# Patient Record
Sex: MALE | Race: WHITE | Employment: FULL TIME | URBAN - METROPOLITAN AREA
[De-identification: names, ages, dates, MRNs, and addresses within clinical notes are randomized per-mention and may not be internally consistent; named-entity substitution may affect disease eponyms.]

---

## 2020-06-01 ENCOUNTER — HOSPITAL ENCOUNTER (OUTPATIENT)
Dept: PREADMISSION TESTING | Age: 50
Discharge: HOME | End: 2020-06-01
Payer: COMMERCIAL

## 2020-06-01 PROCEDURE — 87635 SARS-COV-2 COVID-19 AMP PRB: CPT

## 2020-06-02 LAB — SARS-COV-2, COV2NT: NOT DETECTED

## 2020-06-03 ENCOUNTER — ANESTHESIA EVENT (OUTPATIENT)
Dept: SURGERY | Age: 50
End: 2020-06-03
Payer: COMMERCIAL

## 2020-06-04 ENCOUNTER — ANESTHESIA (OUTPATIENT)
Dept: SURGERY | Age: 50
End: 2020-06-04
Payer: COMMERCIAL

## 2020-06-04 ENCOUNTER — HOSPITAL ENCOUNTER (OUTPATIENT)
Age: 50
Setting detail: OBSERVATION
Discharge: HOME | End: 2020-06-07
Attending: SURGERY | Admitting: SURGERY
Payer: COMMERCIAL

## 2020-06-04 DIAGNOSIS — Z87.19 S/P REPAIR OF VENTRAL HERNIA: Primary | ICD-10-CM

## 2020-06-04 DIAGNOSIS — Z98.890 S/P REPAIR OF VENTRAL HERNIA: Primary | ICD-10-CM

## 2020-06-04 PROBLEM — K43.0 INCISIONAL HERNIA WITH OBSTRUCTION BUT NO GANGRENE: Status: ACTIVE | Noted: 2020-06-04

## 2020-06-04 PROCEDURE — 77030002933 HC SUT MCRYL J&J -A: Performed by: SURGERY

## 2020-06-04 PROCEDURE — 76060000035 HC ANESTHESIA 2 TO 2.5 HR: Performed by: SURGERY

## 2020-06-04 PROCEDURE — 77030010507 HC ADH SKN DERMBND J&J -B: Performed by: SURGERY

## 2020-06-04 PROCEDURE — 77030008462 HC STPLR SKN PROX J&J -A: Performed by: SURGERY

## 2020-06-04 PROCEDURE — 74011250636 HC RX REV CODE- 250/636: Performed by: SPECIALIST

## 2020-06-04 PROCEDURE — 74011250637 HC RX REV CODE- 250/637: Performed by: SURGERY

## 2020-06-04 PROCEDURE — 74011250636 HC RX REV CODE- 250/636: Performed by: SURGERY

## 2020-06-04 PROCEDURE — 99218 HC RM OBSERVATION: CPT

## 2020-06-04 PROCEDURE — 77030020782 HC GWN BAIR PAWS FLX 3M -B: Performed by: SURGERY

## 2020-06-04 PROCEDURE — 76210000006 HC OR PH I REC 0.5 TO 1 HR: Performed by: SURGERY

## 2020-06-04 PROCEDURE — 77030002916 HC SUT ETHLN J&J -A: Performed by: SURGERY

## 2020-06-04 PROCEDURE — 74011000250 HC RX REV CODE- 250: Performed by: NURSE ANESTHETIST, CERTIFIED REGISTERED

## 2020-06-04 PROCEDURE — 77030002966 HC SUT PDS J&J -A: Performed by: SURGERY

## 2020-06-04 PROCEDURE — 77030040361 HC SLV COMPR DVT MDII -B: Performed by: SURGERY

## 2020-06-04 PROCEDURE — C9290 INJ, BUPIVACAINE LIPOSOME: HCPCS | Performed by: SURGERY

## 2020-06-04 PROCEDURE — C1781 MESH (IMPLANTABLE): HCPCS | Performed by: SURGERY

## 2020-06-04 PROCEDURE — 77030036554: Performed by: SURGERY

## 2020-06-04 PROCEDURE — 74011000258 HC RX REV CODE- 258: Performed by: SURGERY

## 2020-06-04 PROCEDURE — 77030031139 HC SUT VCRL2 J&J -A: Performed by: SURGERY

## 2020-06-04 PROCEDURE — 96376 TX/PRO/DX INJ SAME DRUG ADON: CPT

## 2020-06-04 PROCEDURE — 74011250636 HC RX REV CODE- 250/636: Performed by: NURSE ANESTHETIST, CERTIFIED REGISTERED

## 2020-06-04 PROCEDURE — 77030013567 HC DRN WND RESERV BARD -A: Performed by: SURGERY

## 2020-06-04 PROCEDURE — 77030040504 HC DRN WND MDII -B: Performed by: SURGERY

## 2020-06-04 PROCEDURE — 76010000131 HC OR TIME 2 TO 2.5 HR: Performed by: SURGERY

## 2020-06-04 DEVICE — MESH SURG W6XL6IN POLY POLYLACTIC ACID MFIL KNIT RECTANG: Type: IMPLANTABLE DEVICE | Site: ABDOMEN | Status: FUNCTIONAL

## 2020-06-04 RX ORDER — KETAMINE HYDROCHLORIDE 10 MG/ML
INJECTION, SOLUTION INTRAMUSCULAR; INTRAVENOUS AS NEEDED
Status: DISCONTINUED | OUTPATIENT
Start: 2020-06-04 | End: 2020-06-04 | Stop reason: HOSPADM

## 2020-06-04 RX ORDER — HYDROMORPHONE HYDROCHLORIDE 1 MG/ML
1 INJECTION, SOLUTION INTRAMUSCULAR; INTRAVENOUS; SUBCUTANEOUS
Status: DISCONTINUED | OUTPATIENT
Start: 2020-06-04 | End: 2020-06-07

## 2020-06-04 RX ORDER — FENTANYL CITRATE 50 UG/ML
25 INJECTION, SOLUTION INTRAMUSCULAR; INTRAVENOUS
Status: DISCONTINUED | OUTPATIENT
Start: 2020-06-04 | End: 2020-06-04 | Stop reason: HOSPADM

## 2020-06-04 RX ORDER — DIPHENHYDRAMINE HCL 25 MG
25 CAPSULE ORAL
Status: DISCONTINUED | OUTPATIENT
Start: 2020-06-04 | End: 2020-06-07 | Stop reason: HOSPADM

## 2020-06-04 RX ORDER — ACETAMINOPHEN 325 MG/1
650 TABLET ORAL
Status: DISCONTINUED | OUTPATIENT
Start: 2020-06-04 | End: 2020-06-07 | Stop reason: HOSPADM

## 2020-06-04 RX ORDER — ONDANSETRON 2 MG/ML
4 INJECTION INTRAMUSCULAR; INTRAVENOUS
Status: DISCONTINUED | OUTPATIENT
Start: 2020-06-04 | End: 2020-06-07 | Stop reason: HOSPADM

## 2020-06-04 RX ORDER — DOCUSATE SODIUM 100 MG/1
100 CAPSULE, LIQUID FILLED ORAL 2 TIMES DAILY
Status: DISCONTINUED | OUTPATIENT
Start: 2020-06-04 | End: 2020-06-07 | Stop reason: HOSPADM

## 2020-06-04 RX ORDER — FENTANYL CITRATE 50 UG/ML
INJECTION, SOLUTION INTRAMUSCULAR; INTRAVENOUS AS NEEDED
Status: DISCONTINUED | OUTPATIENT
Start: 2020-06-04 | End: 2020-06-04 | Stop reason: HOSPADM

## 2020-06-04 RX ORDER — HYDROMORPHONE HYDROCHLORIDE 2 MG/ML
INJECTION, SOLUTION INTRAMUSCULAR; INTRAVENOUS; SUBCUTANEOUS AS NEEDED
Status: DISCONTINUED | OUTPATIENT
Start: 2020-06-04 | End: 2020-06-04 | Stop reason: HOSPADM

## 2020-06-04 RX ORDER — PROPOFOL 10 MG/ML
INJECTION, EMULSION INTRAVENOUS AS NEEDED
Status: DISCONTINUED | OUTPATIENT
Start: 2020-06-04 | End: 2020-06-04 | Stop reason: HOSPADM

## 2020-06-04 RX ORDER — MIDAZOLAM HYDROCHLORIDE 1 MG/ML
INJECTION, SOLUTION INTRAMUSCULAR; INTRAVENOUS AS NEEDED
Status: DISCONTINUED | OUTPATIENT
Start: 2020-06-04 | End: 2020-06-04 | Stop reason: HOSPADM

## 2020-06-04 RX ORDER — LIDOCAINE HYDROCHLORIDE 20 MG/ML
INJECTION, SOLUTION EPIDURAL; INFILTRATION; INTRACAUDAL; PERINEURAL AS NEEDED
Status: DISCONTINUED | OUTPATIENT
Start: 2020-06-04 | End: 2020-06-04 | Stop reason: HOSPADM

## 2020-06-04 RX ORDER — ROCURONIUM BROMIDE 10 MG/ML
INJECTION, SOLUTION INTRAVENOUS AS NEEDED
Status: DISCONTINUED | OUTPATIENT
Start: 2020-06-04 | End: 2020-06-04 | Stop reason: HOSPADM

## 2020-06-04 RX ORDER — SODIUM CHLORIDE, SODIUM LACTATE, POTASSIUM CHLORIDE, CALCIUM CHLORIDE 600; 310; 30; 20 MG/100ML; MG/100ML; MG/100ML; MG/100ML
125 INJECTION, SOLUTION INTRAVENOUS CONTINUOUS
Status: DISCONTINUED | OUTPATIENT
Start: 2020-06-04 | End: 2020-06-04

## 2020-06-04 RX ORDER — ONDANSETRON 2 MG/ML
4 INJECTION INTRAMUSCULAR; INTRAVENOUS ONCE
Status: COMPLETED | OUTPATIENT
Start: 2020-06-04 | End: 2020-06-04

## 2020-06-04 RX ORDER — HYDROMORPHONE HYDROCHLORIDE 2 MG/ML
0.5 INJECTION, SOLUTION INTRAMUSCULAR; INTRAVENOUS; SUBCUTANEOUS
Status: COMPLETED | OUTPATIENT
Start: 2020-06-04 | End: 2020-06-04

## 2020-06-04 RX ORDER — SODIUM CHLORIDE, SODIUM LACTATE, POTASSIUM CHLORIDE, CALCIUM CHLORIDE 600; 310; 30; 20 MG/100ML; MG/100ML; MG/100ML; MG/100ML
50 INJECTION, SOLUTION INTRAVENOUS CONTINUOUS
Status: DISCONTINUED | OUTPATIENT
Start: 2020-06-04 | End: 2020-06-04 | Stop reason: HOSPADM

## 2020-06-04 RX ORDER — NALOXONE HYDROCHLORIDE 0.4 MG/ML
0.1 INJECTION, SOLUTION INTRAMUSCULAR; INTRAVENOUS; SUBCUTANEOUS
Status: DISCONTINUED | OUTPATIENT
Start: 2020-06-04 | End: 2020-06-04 | Stop reason: HOSPADM

## 2020-06-04 RX ORDER — OXYCODONE AND ACETAMINOPHEN 5; 325 MG/1; MG/1
1 TABLET ORAL
Status: DISCONTINUED | OUTPATIENT
Start: 2020-06-04 | End: 2020-06-07 | Stop reason: HOSPADM

## 2020-06-04 RX ORDER — SODIUM CHLORIDE 0.9 % (FLUSH) 0.9 %
5-40 SYRINGE (ML) INJECTION AS NEEDED
Status: DISCONTINUED | OUTPATIENT
Start: 2020-06-04 | End: 2020-06-07 | Stop reason: HOSPADM

## 2020-06-04 RX ORDER — SODIUM CHLORIDE 0.9 % (FLUSH) 0.9 %
5-40 SYRINGE (ML) INJECTION EVERY 8 HOURS
Status: DISCONTINUED | OUTPATIENT
Start: 2020-06-04 | End: 2020-06-07 | Stop reason: HOSPADM

## 2020-06-04 RX ORDER — ONDANSETRON 2 MG/ML
INJECTION INTRAMUSCULAR; INTRAVENOUS AS NEEDED
Status: DISCONTINUED | OUTPATIENT
Start: 2020-06-04 | End: 2020-06-04 | Stop reason: HOSPADM

## 2020-06-04 RX ORDER — DEXTROSE, SODIUM CHLORIDE, AND POTASSIUM CHLORIDE 5; .45; .15 G/100ML; G/100ML; G/100ML
50 INJECTION INTRAVENOUS CONTINUOUS
Status: DISCONTINUED | OUTPATIENT
Start: 2020-06-04 | End: 2020-06-07 | Stop reason: HOSPADM

## 2020-06-04 RX ADMIN — SODIUM CHLORIDE, SODIUM LACTATE, POTASSIUM CHLORIDE, AND CALCIUM CHLORIDE: 600; 310; 30; 20 INJECTION, SOLUTION INTRAVENOUS at 15:38

## 2020-06-04 RX ADMIN — ONDANSETRON HYDROCHLORIDE 4 MG: 2 INJECTION INTRAMUSCULAR; INTRAVENOUS at 16:06

## 2020-06-04 RX ADMIN — HYDROMORPHONE HYDROCHLORIDE 0.5 MG: 2 INJECTION, SOLUTION INTRAMUSCULAR; INTRAVENOUS; SUBCUTANEOUS at 18:26

## 2020-06-04 RX ADMIN — FENTANYL CITRATE 25 MCG: 50 INJECTION INTRAMUSCULAR; INTRAVENOUS at 17:50

## 2020-06-04 RX ADMIN — ONDANSETRON 4 MG: 2 INJECTION INTRAMUSCULAR; INTRAVENOUS at 18:16

## 2020-06-04 RX ADMIN — FENTANYL CITRATE 50 MCG: 50 INJECTION, SOLUTION INTRAMUSCULAR; INTRAVENOUS at 15:46

## 2020-06-04 RX ADMIN — OXYCODONE HYDROCHLORIDE AND ACETAMINOPHEN 1 TABLET: 5; 325 TABLET ORAL at 22:20

## 2020-06-04 RX ADMIN — KETAMINE HYDROCHLORIDE 20 MG: 10 INJECTION, SOLUTION INTRAMUSCULAR; INTRAVENOUS at 15:50

## 2020-06-04 RX ADMIN — KETAMINE HYDROCHLORIDE 10 MG: 10 INJECTION, SOLUTION INTRAMUSCULAR; INTRAVENOUS at 15:45

## 2020-06-04 RX ADMIN — FENTANYL CITRATE 50 MCG: 50 INJECTION, SOLUTION INTRAMUSCULAR; INTRAVENOUS at 15:38

## 2020-06-04 RX ADMIN — FENTANYL CITRATE 25 MCG: 50 INJECTION INTRAMUSCULAR; INTRAVENOUS at 18:00

## 2020-06-04 RX ADMIN — SODIUM CHLORIDE, SODIUM LACTATE, POTASSIUM CHLORIDE, AND CALCIUM CHLORIDE 125 ML/HR: 600; 310; 30; 20 INJECTION, SOLUTION INTRAVENOUS at 18:34

## 2020-06-04 RX ADMIN — ROCURONIUM BROMIDE 10 MG: 10 INJECTION, SOLUTION INTRAVENOUS at 17:05

## 2020-06-04 RX ADMIN — ROCURONIUM BROMIDE 50 MG: 10 INJECTION, SOLUTION INTRAVENOUS at 15:46

## 2020-06-04 RX ADMIN — LIDOCAINE HYDROCHLORIDE 80 MG: 20 INJECTION, SOLUTION EPIDURAL; INFILTRATION; INTRACAUDAL; PERINEURAL at 15:46

## 2020-06-04 RX ADMIN — ROCURONIUM BROMIDE 10 MG: 10 INJECTION, SOLUTION INTRAVENOUS at 16:45

## 2020-06-04 RX ADMIN — KETAMINE HYDROCHLORIDE 10 MG: 10 INJECTION, SOLUTION INTRAMUSCULAR; INTRAVENOUS at 16:03

## 2020-06-04 RX ADMIN — HYDROMORPHONE HYDROCHLORIDE 1 MG: 1 INJECTION, SOLUTION INTRAMUSCULAR; INTRAVENOUS; SUBCUTANEOUS at 23:29

## 2020-06-04 RX ADMIN — HYDROMORPHONE HYDROCHLORIDE 1 MG: 1 INJECTION, SOLUTION INTRAMUSCULAR; INTRAVENOUS; SUBCUTANEOUS at 19:43

## 2020-06-04 RX ADMIN — HYDROMORPHONE HYDROCHLORIDE 0.5 MG: 2 INJECTION, SOLUTION INTRAMUSCULAR; INTRAVENOUS; SUBCUTANEOUS at 18:06

## 2020-06-04 RX ADMIN — PROPOFOL 200 MG: 10 INJECTION, EMULSION INTRAVENOUS at 15:46

## 2020-06-04 RX ADMIN — HYDROMORPHONE HYDROCHLORIDE 0.5 MG: 2 INJECTION, SOLUTION INTRAMUSCULAR; INTRAVENOUS; SUBCUTANEOUS at 16:30

## 2020-06-04 RX ADMIN — SUGAMMADEX 200 MG: 100 INJECTION, SOLUTION INTRAVENOUS at 17:34

## 2020-06-04 RX ADMIN — ROCURONIUM BROMIDE 10 MG: 10 INJECTION, SOLUTION INTRAVENOUS at 16:07

## 2020-06-04 RX ADMIN — SODIUM CHLORIDE, SODIUM LACTATE, POTASSIUM CHLORIDE, AND CALCIUM CHLORIDE: 600; 310; 30; 20 INJECTION, SOLUTION INTRAVENOUS at 17:19

## 2020-06-04 RX ADMIN — DEXTROSE MONOHYDRATE, SODIUM CHLORIDE, AND POTASSIUM CHLORIDE 125 ML/HR: 50; 4.5; 1.49 INJECTION, SOLUTION INTRAVENOUS at 23:14

## 2020-06-04 RX ADMIN — SODIUM CHLORIDE, SODIUM LACTATE, POTASSIUM CHLORIDE, AND CALCIUM CHLORIDE 125 ML/HR: 600; 310; 30; 20 INJECTION, SOLUTION INTRAVENOUS at 13:32

## 2020-06-04 RX ADMIN — ONDANSETRON 4 MG: 2 INJECTION INTRAMUSCULAR; INTRAVENOUS at 23:42

## 2020-06-04 RX ADMIN — HYDROMORPHONE HYDROCHLORIDE 0.5 MG: 2 INJECTION, SOLUTION INTRAMUSCULAR; INTRAVENOUS; SUBCUTANEOUS at 18:36

## 2020-06-04 RX ADMIN — MIDAZOLAM 2 MG: 1 INJECTION INTRAMUSCULAR; INTRAVENOUS at 15:38

## 2020-06-04 RX ADMIN — VANCOMYCIN HYDROCHLORIDE 1500 MG: 1.5 INJECTION, POWDER, LYOPHILIZED, FOR SOLUTION INTRAVENOUS at 14:46

## 2020-06-04 RX ADMIN — HYDROMORPHONE HYDROCHLORIDE 0.5 MG: 2 INJECTION, SOLUTION INTRAMUSCULAR; INTRAVENOUS; SUBCUTANEOUS at 17:23

## 2020-06-04 RX ADMIN — HYDROMORPHONE HYDROCHLORIDE 0.5 MG: 2 INJECTION, SOLUTION INTRAMUSCULAR; INTRAVENOUS; SUBCUTANEOUS at 18:16

## 2020-06-04 RX ADMIN — KETAMINE HYDROCHLORIDE 10 MG: 10 INJECTION, SOLUTION INTRAMUSCULAR; INTRAVENOUS at 16:25

## 2020-06-04 NOTE — ANESTHESIA POSTPROCEDURE EVALUATION
Post-Anesthesia Evaluation and Assessment    Cardiovascular Function/Vital Signs  Visit Vitals  /70   Pulse 73   Temp 37 °C (98.6 °F)   Resp 12   Ht 5' 9\" (1.753 m)   Wt 100 kg (220 lb 8 oz)   SpO2 97%   BMI 32.56 kg/m²       Patient is status post Procedure(s):  OPEN REPAIR INCARCERATED RECURRENT INCISIONAL HERNIA. Nausea/Vomiting: Controlled. Postoperative hydration reviewed and adequate. Pain:  Pain Scale 1: FLACC (06/04/20 1840)  Pain Intensity 1: 0 (06/04/20 1840)   Managed. Neurological Status:   Neuro (WDL): Exceptions to WDL (06/04/20 1751)   At baseline. Mental Status and Level of Consciousness: Baseline and appropriate for discharge. Pulmonary Status:   O2 Device: Nasal cannula (06/04/20 1840)   Adequate oxygenation and airway patent. Complications related to anesthesia: None    Post-anesthesia assessment completed. No concerns. Patient has met all discharge requirements.     Signed By: Kalie Dodson CRNA    June 4, 2020

## 2020-06-04 NOTE — PROGRESS NOTES
Description: RAD-CT ABDOMEN AND PELVIS W/O DYE. Patient Name: Cahranjit Narvaez  : [id number]  Referring: Miguel Ángel VACA:  Accession: [id number]  Date of Exam: 2020  K43.0 - Incisional hernia with obstruction, without gangrene  Study Desc:   CT ABDOMEN AND PELVIS W/O DYE     CT ABDOMEN AND PELVIS WITHOUT CONTRAST:     HISTORY:  Preoperative. Hernia repair. TECHNIQUE:  Multiple contiguous axial CT images of the abdomen and pelvis were obtained   without the administration of IV or oral contrast.     DLP:  1332 mGy-cm. COMPARISON:  CT dated 2017     FINDINGS:     ABDOMEN:     LUNG BASES:  The lung bases are clear. The visualized portion of the heart is   unremarkable. LIVER:  The unenhanced liver demonstrates mild to moderate hepatic steatosis. No focal   liver lesion is identified. GALLBLADDER/BILIARY:  The gallbladder is surgically absent. PANCREAS:  Within normal limits. SPLEEN:  Normal in size and attenuation. ADRENALS:  Unremarkable. KIDNEYS:  Normal in size. No evidence of a focal mass, hydronephrosis or nephrolithiasis. ABDOMEN AND PELVIS:     GASTROINTESTINAL TRACT:  The stomach is normal in appearance. The small bowel loops are   normal in caliber without evidence of wall thickening or obstruction. There is a small   bowel anastomosis without complication within a right lower quadrant hernia. The   appendix is absent. The large bowel is unremarkable without evidence of wall thickening   or obstruction. PERITONEUM/MESENTERY:  Unremarkable. LYMPH NODES:  No pathologic lymph nodes enlargement in the abdomen or pelvis by CT   imaging size criteria. VASCULATURE:  The abdominal and pelvic vasculature is unremarkable. EXTRAPERITONEUM:  There is a right lower quadrant hernia containing fat and small bowel. The hernia defect measures 5.1 x 6.5 cm in dimension. This includes a small bowel   anastomosis.   There is no evidence of bowel incarceration or obstruction. The patient is   status post ventral abdominal or pair with surgical mesh. The above described hernia   extends through an apparent defect in the mesh. SKELETAL STRUCTURES:  Mild spondylitic changes are present in the lower thoracic spine. PELVIS:     URINARY BLADDER: Unremarkable. REPRODUCTIVE:  The prostate is unremarkable. IMPRESSION:     1. Small bowel containing right lower quadrant hernia without evidence of incarceration   or obstruction. A small bowel anastomosis is present within the hernia. 2. Mild-to-moderate hepatic steatosis. Signed by: Aldair Carpio MD  2020-05-28 15:17:04      Electronically signed by Rika Wilson MD on 06/02/2020 04:45 PM

## 2020-06-04 NOTE — BRIEF OP NOTE
Brief Postoperative Note    Patient: Holli Li  YOB: 1970  MRN: [id number]    Date of Procedure: 6/4/2020     Pre-Op Diagnosis: INCARCERATED RECURRENT INCISIONAL HERNIA    Post-Op Diagnosis: Same as preoperative diagnosis. Procedure(s):  OPEN REPAIR INCARCERATED RECURRENT INCISIONAL HERNIA    Surgeon(s): Ana Kirby MD    Surgical Assistant: None    Anesthesia: General     Estimated Blood Loss (mL): less than 50     Complications: None    Specimens: * No specimens in log *     Implants:   Implant Name Type Inv.  Item Serial No.  Lot No. LRB No. Used Action   MESH POLYSTR SLF- [id number] -- PROGRIP - [id number]  MESH POLYSTR SLF- [id number] -- PROGRIP  COVIDIEN  SURGICAL [id number] N/A 1 Implanted   MESH POLYSTR SLF- [id number] -- PROGRIP - [id number]  MESH POLYSTR SLF- [id number] -- PROGRIP  COVID[employer]EN  SURGICAL [id number] N/A 1 Implanted       Drains:   Melvin-Pederson Drain 06/04/20 Right Abdomen (Active)       Findings: inc hernia      Electronically Signed by Chano Romero MD on 6/4/2020 at 5:30 PM

## 2020-06-04 NOTE — PROGRESS NOTES
19:25 Arrived from PACU via bed. O2 is @ 2 LPM per NC. Lungs are clear bilat. ABD dsg is C/D/I with binder in place. MADINA with serosanguinous drainage. Skin assessment completed with A [place] RN. 22:55 Shift assessment completed. See nsg flow sheet for details. 02:45 Reassessed with 0 changes noted. Resting quietly in bed with eyes closed between cares. 06:30 Bedside and Verbal shift change report given to HOLGER Bernard RN (oncoming nurse) by Rocio Ma RN (offgoing nurse). Report included the following information SBAR.

## 2020-06-04 NOTE — H&P
Assessment/Plan  # Detail Type Description    1. Assessment Incisional hernia with obstruction, without gangrene (K43.0). Patient Plan Complex recurrence. Reviewed old CT. Needs new CT to plan surgery. Will need complex open repair with myofascial release. Will see in office. I have discussed the risks benefits and alternatives of the procedure to the patient including bleeding, infection, use of mesh, myofascial release, chronic post op pain, reason and side effects of nerve resections, recurrence. They understand and wish to proceed. Plan Orders Further diagnostic evaluations ordered today include(s) CT Abdomen And Pelvis W/ DYE to be performed. 2. Assessment Crohn disease (K50.919). Patient Plan Stable on no meds. 3. Assessment Asthma (J45.998). 4. Assessment Malabsorption (K90.9). 5. Assessment Body mass index (BMI) 33.0-33.9, adult (O17.78). Plan Orders Today's instructions / counseling include(s) Dietary management education, guidance, and counseling. This 51year old male presents for Hernia. History of Present Illness:  1. Hernia   Duration: 1 Month. Severity: 9. The problem is worse. It occurs constantly. , abdominal scar and RLQ  There is radiation to lower abdomen. The patient describes it as sharp. Context includes physical activity. Identified risk factors include history of hernias, obesity and previous abdominal surgery. Symptom is aggravated by pressure to abdomen. He is also experiencing bloating, diarrhea and nausea. Pertinent negatives include abdominal distention, anorexia, back pain, blood in stool, constipation, cough, diaphoresis, dizziness, dyspnea, epigastric pain, eructation, fatigue, fever, flank pain, flatulence, heartburn, hematuria, jaundice, lightheadedness, menstruation, milk/dairy intolerance, myalgia, post prandial fullness, reflux, scrotal swelling, vomiting, weight gain and weight loss.   Additional information: Pain RLQ at RLQ recurrent incisional hernia site. More pain now and larger. Fady Tony PROBLEM LIST:   Problem List reviewed. Problem Description Onset Date Chronic Clinical Status Notes   Crohn disease 10/15/2019 N     Environmental allergies 10/15/2019 N     Asthma 10/15/2019 N     Malabsorption 10/15/2019 N     Fatigue 10/15/2019 N           PAST MEDICAL/SURGICAL HISTORY  (Detailed)    Disease/disorder Onset Date Management Date Comments     Hernia repair       Crohn's surgery in [place] with SB resection       Family History  (Detailed)    Social History:  (Detailed)  Tobacco use reviewed. Preferred language is [place]. MARITAL STATUS/FAMILY/SOCIAL SUPPORT  Marital status:    Tobacco use status: Current non-smoker. Smoking status: Never smoker. TOBACCO SCREENING:  Patient has never used tobacco. Patient has not used tobacco in the last 30 days. Patient has not used smokeless tobacco in the last 30 days. SMOKING STATUS  Type Smoking Status Usage Per Day Years Used Pack Years Total Pack Years    Never smoker               Medications (active prior to today)  Medication Name Sig Description Start Date Stop Date Refilled Rx Elsewhere   Imodium A-D 2 mg tablet take 2 tablet by oral route after 1st loose stool and 1 tablet (2 mg) after each next bowel movement; do not exceed 16 mg in 24hrs 10/15/2019   N   Probiotic 10 billion cell capsule  10/15/2019   N   Vitamin D3 1,000 unit capsule  10/15/2019   N   gabapentin 100 mg capsule take 1 capsule by oral route 3 times every day for pain 04/28/2020   N   chlorzoxazone 500 mg tablet take 1 tablet by oral route 3 times every day for pain 04/28/2020   N     Patient Status   Completed with information received for patient transitioning into care. Medication Reconciliation  Medications reconciled today.   Medication Reviewed  Adherence Medication Name Sig Desc Elsewhere Status   taking as directed Imodium A-D 2 mg tablet take 2 tablet by oral route after 1st loose stool and 1 tablet (2 mg) after each next bowel movement; do not exceed 16 mg in 24hrs N Verified   taking as directed Probiotic 10 billion cell capsule  N Verified   taking as directed Vitamin D3 1,000 unit capsule  N Verified   taking as directed gabapentin 100 mg capsule take 1 capsule by oral route 3 times every day for pain N Verified   taking as directed chlorzoxazone 500 mg tablet take 1 tablet by oral route 3 times every day for pain N Verified     Allergies:  Ingredient Reaction (Severity) Medication Name Comment   ADALIMUMAB      KETOROLAC TROMETHAMINE      METOCLOPRAMIDE HCL  Reglan    MORPHINE      NALBUPHINE      PROCHLORPERAZINE      SHELLFISH DERIVED      TRAMADOL      Reviewed, no changes. Review of Systems  System Neg/Pos Details   Constitutional Negative Fever, Night sweats and Weight loss. ENMT Negative Hearing loss, Tinnitus, Vertigo and Voice change. Eyes Negative Diplopia and Vision loss. Respiratory Negative Asthma, Cough, Dyspnea, Hemoptysis, Known TB exposure and Wheezing. Cardio Positive Leg cramps. Cardio Negative Chest pain, Claudication, Edema, Irregular heartbeat/palpitations and Thrombophlebitis. GI Positive Nausea, Bloating. GI Negative Bloating, Dysphagia, Hemorrhoids, Jaundice and Reflux.  Negative Dysuria, Nocturia, Passage stone/gravel and Urinary incontinence. Endocrine Negative Cold intolerance and Goiter. Neuro Negative Focal weakness, Headache, Paresthesia, Seizures and Syncope. Integumentary Negative Change in shape/size of mole(s) and Skin lesion. MS Negative Back pain, Bone/joint symptoms and Muscle weakness. Hema/Lymph Negative Easy bleeding and Easy bruising. Allergic/Immuno Negative Contact allergy and Contact dermatitis.        Vital Signs   Height  Time ft in cm Last Measured Height Position   9:14 AM 5.0 8.00 172.72 05/07/2020 Standing   Weight/BSA/BMI  Time lb oz kg Context BMI kg/m2 BSA m2   9:14 .80 101.060 dressed with shoes 33.88    Blood Pressure  Time BP mm/Hg Position Side Site Method Cuff Size   9:14 /74 sitting right arm manual adult   Temperature/Pulse/Respiration  Time Temp F Temp C Temp Site Pulse/min Pattern Resp/ min   9:14 AM 98.50 36.94 oral 76 regular 12   Pulse Oximetry/FIO2  Time Pulse Ox (Rest %) Pulse Ox (Amb %) O2 Sat O2 L/Min Timing FiO2 % L/min Delivery Method Finger Probe   9:14 AM 98  RA      L Index   Pain Scale  Time Pain Score Method   9:14 AM 9/10 Numeric Pain Intensity Scale   Measured By  Time Measured by   9:14 AM Elzbieta Postin     Physical Exam:  Exam Findings Details   Constitutional Normal Well developed. Eyes Normal Conjunctiva - Right: Normal, Left: Normal. Pupil - Right: Normal.   Ears Normal Inspection - Right: Normal, Left: Normal. Hearing - Right: Normal, Left: Normal.   Nose/Mouth/Throat Normal External nose - Normal. Lips/teeth/gums - Normal. Oropharynx - Normal.   Neck Exam Normal Inspection - Normal. Palpation - Normal. Thyroid gland - Normal.   Lymph Detail Normal No cervical or supraclavicular adenopathy. Respiratory Normal Inspection - Normal. Auscultation - Normal. Effort - Normal.   Cardiovascular Normal Regular rate and rhythm. No murmurs, gallops, or rubs. Vascular Normal Pulses - Dorsalis pedis: Normal. Capillary refill - Less than 2 seconds. Abdomen * Obese. Abdominal tenderness - RLQ. Hernia - Positive. Type: incisional. Location: right, central. Non reducible. RLQ . Abdomen Normal Umbilicus - Normal. No hepatic enlargement. Non reducible No ascites. No palpable mass. Genitourinary Normal No hernia. Skin Normal Inspection - Normal.   Musculoskeletal Normal Visual overview of all four extremities is normal.   Extremity Normal No edema. Neurological Normal Memory - Normal. Cranial nerves - Cranial nerves I grossly intact, Cranial nerves II through XII grossly intact.    Psychiatric Normal Orientation - Oriented to time, place, person & situation. Appropriate mood and affect. Normal insight. Normal judgment.            Medications (added, continued, or stopped this visit):  Start Date Medication Directions PRN Status PRN Reason Instruction Stop Date   04/28/2020 chlorzoxazone 500 mg tablet take 1 tablet by oral route 3 times every day for pain N      04/28/2020 gabapentin 100 mg capsule take 1 capsule by oral route 3 times every day for pain N      10/15/2019 Imodium A-D 2 mg tablet take 2 tablet by oral route after 1st loose stool and 1 tablet (2 mg) after each next bowel movement; do not exceed 16 mg in 24hrs N      10/15/2019 Probiotic 10 billion cell capsule  N      10/15/2019 Vitamin D3 1,000 unit capsule  N        To Be Scheduled / Ordered:  Status Order Reason Assessment Timeframe Appointment   ordered CT Abdomen And Pelvis W/ DYE  K43.0

## 2020-06-04 NOTE — INTERVAL H&P NOTE
Update History & Physical 
 
The Patient's History and Physical of June 4,  
 was reviewed with the patient and I examined the patient. There was no change. The surgical site was confirmed by the patient and me. Plan:  The risk, benefits, expected outcome, and alternative to the recommended procedure have been discussed with the patient. Patient understands and wants to proceed with the procedure.  
 
Electronically signed by John Polk MD on 6/4/2020 at 1:34 PM

## 2020-06-04 NOTE — PROGRESS NOTES
Pt transferred to room 319. Pt stable. Dressing CDI. Alverto George, RN at bedside.         06/04/20 1908   Vitals   Temp 98.6 °F (37 °C)   Temp Source Oral   Pulse (Heart Rate) 71   Heart Rate Source Monitor   Resp Rate 14   O2 Sat (%) 99 %   Level of Consciousness Alert   /66   MAP (Calculated) 83   MEWS Score 0

## 2020-06-04 NOTE — PERIOP NOTES
Reviewed PTA medication list with patient/caregiver and patient/caregiver denies any additional medications. Patient admits to having a responsible adult care for them at home for at least 24 hours after surgery. Patient encouraged to use gown warming system and informed that using said warming gown to regulate body temperature prior to a procedure has been shown to help reduce the risks of blood clots and infection. Dual skin assessment & fall risk band verification completed with Slava Hughes RN.

## 2020-06-04 NOTE — PERIOP NOTES
TRANSFER - OUT REPORT:    Verbal report given to Ayden Staley RN (name) on Wilma Moe  being transferred to [address] [place] [address] (unit) for routine post - op       Report consisted of patients Situation, Background, Assessment and   Recommendations(SBAR). Information from the following report(s) SBAR, Kardex, OR Summary, Intake/Output and MAR was reviewed with the receiving nurse. Lines:   Peripheral IV 06/04/20 Right Other(comment) (Active)   Site Assessment Clean, dry, & intact 6/4/2020  6:40 PM   Phlebitis Assessment 0 6/4/2020  6:40 PM   Infiltration Assessment 0 6/4/2020  6:40 PM   Dressing Status Clean, dry, & intact 6/4/2020  6:40 PM   Dressing Type Transparent;Tape 6/4/2020  6:40 PM   Hub Color/Line Status Infusing 6/4/2020  6:40 PM   Alcohol Cap Used No 6/4/2020  1:34 PM        Opportunity for questions and clarification was provided.       Patient transported with:   O2 @ 2 liters  Registered Nurse

## 2020-06-05 PROCEDURE — 74011250636 HC RX REV CODE- 250/636: Performed by: SURGERY

## 2020-06-05 PROCEDURE — 99218 HC RM OBSERVATION: CPT

## 2020-06-05 PROCEDURE — 96375 TX/PRO/DX INJ NEW DRUG ADDON: CPT

## 2020-06-05 PROCEDURE — 74011250637 HC RX REV CODE- 250/637: Performed by: SURGERY

## 2020-06-05 PROCEDURE — 96374 THER/PROPH/DIAG INJ IV PUSH: CPT

## 2020-06-05 RX ORDER — HYDROMORPHONE HYDROCHLORIDE 1 MG/ML
2 INJECTION, SOLUTION INTRAMUSCULAR; INTRAVENOUS; SUBCUTANEOUS ONCE
Status: COMPLETED | OUTPATIENT
Start: 2020-06-05 | End: 2020-06-05

## 2020-06-05 RX ADMIN — OXYCODONE HYDROCHLORIDE AND ACETAMINOPHEN 1 TABLET: 5; 325 TABLET ORAL at 02:29

## 2020-06-05 RX ADMIN — HYDROMORPHONE HYDROCHLORIDE 2 MG: 1 INJECTION, SOLUTION INTRAMUSCULAR; INTRAVENOUS; SUBCUTANEOUS at 01:57

## 2020-06-05 RX ADMIN — OXYCODONE HYDROCHLORIDE AND ACETAMINOPHEN 1 TABLET: 5; 325 TABLET ORAL at 14:36

## 2020-06-05 RX ADMIN — HYDROMORPHONE HYDROCHLORIDE 1 MG: 1 INJECTION, SOLUTION INTRAMUSCULAR; INTRAVENOUS; SUBCUTANEOUS at 13:35

## 2020-06-05 RX ADMIN — HYDROMORPHONE HYDROCHLORIDE 1 MG: 1 INJECTION, SOLUTION INTRAMUSCULAR; INTRAVENOUS; SUBCUTANEOUS at 09:33

## 2020-06-05 RX ADMIN — HYDROMORPHONE HYDROCHLORIDE 1 MG: 1 INJECTION, SOLUTION INTRAMUSCULAR; INTRAVENOUS; SUBCUTANEOUS at 05:39

## 2020-06-05 RX ADMIN — HYDROMORPHONE HYDROCHLORIDE 1 MG: 1 INJECTION, SOLUTION INTRAMUSCULAR; INTRAVENOUS; SUBCUTANEOUS at 21:36

## 2020-06-05 RX ADMIN — DEXTROSE MONOHYDRATE, SODIUM CHLORIDE, AND POTASSIUM CHLORIDE 125 ML/HR: 50; 4.5; 1.49 INJECTION, SOLUTION INTRAVENOUS at 07:30

## 2020-06-05 RX ADMIN — ONDANSETRON 4 MG: 2 INJECTION INTRAMUSCULAR; INTRAVENOUS at 10:39

## 2020-06-05 RX ADMIN — ONDANSETRON 4 MG: 2 INJECTION INTRAMUSCULAR; INTRAVENOUS at 14:36

## 2020-06-05 RX ADMIN — OXYCODONE HYDROCHLORIDE AND ACETAMINOPHEN 1 TABLET: 5; 325 TABLET ORAL at 10:39

## 2020-06-05 RX ADMIN — HYDROMORPHONE HYDROCHLORIDE 1 MG: 1 INJECTION, SOLUTION INTRAMUSCULAR; INTRAVENOUS; SUBCUTANEOUS at 17:33

## 2020-06-05 RX ADMIN — Medication 10 ML: at 17:33

## 2020-06-05 RX ADMIN — OXYCODONE HYDROCHLORIDE AND ACETAMINOPHEN 1 TABLET: 5; 325 TABLET ORAL at 23:00

## 2020-06-05 RX ADMIN — ONDANSETRON 4 MG: 2 INJECTION INTRAMUSCULAR; INTRAVENOUS at 21:36

## 2020-06-05 RX ADMIN — OXYCODONE HYDROCHLORIDE AND ACETAMINOPHEN 1 TABLET: 5; 325 TABLET ORAL at 18:34

## 2020-06-05 RX ADMIN — OXYCODONE HYDROCHLORIDE AND ACETAMINOPHEN 1 TABLET: 5; 325 TABLET ORAL at 06:23

## 2020-06-05 NOTE — PROGRESS NOTES
Problem: Falls - Risk of  Goal: *Absence of Falls  Description: Document Ceci Rios Fall Risk and appropriate interventions in the flowsheet.   Outcome: Progressing Towards Goal  Note: Fall Risk Interventions:  Mobility Interventions: Communicate number of staff needed for ambulation/transfer, Patient to call before getting OOB         Medication Interventions: Assess postural VS orthostatic hypotension, Patient to call before getting OOB, Teach patient to arise slowly    Elimination Interventions: Call light in reach, Patient to call for help with toileting needs, Urinal in reach

## 2020-06-05 NOTE — PROGRESS NOTES
Transition of Care (CATHERINE) Plan:    Home with physician follow up     Chart reviewed. Pt admitted for an elective surgical procedure (OPEN REPAIR INCARCERATED RECURRENT INCISIONAL HERNIA). Pt is independent. Please encourage ambulation. No transition of care needs identified at this time. Anticipate pt will be medically stable for discharge within the next 24-48 hours with physician follow up. CM available to assist as needed. CATHERINE Transportation:   How is patient being transported at discharge? Family/Friend      When? Once cleared by physician     Is transport scheduled? N/A      Follow-up appointment and transportation:   PCP/Specialist?  See AVS for Appointment         Who is transporting to the follow-up appointment? Self/Family/Friend      Is transport for follow up appointment scheduled? N/A    Communication plan (with patient/family): Who is being called? Patient or Next of Kin? Responsible party? Patient      What number(s) is to be used? See Facesheet      What service provider is calling for [facility] services? When are they calling? Readmission Risk? (Green/Low; Yellow/Moderate; Red/High):  Green    Care Management Interventions  Mode of Transport at Discharge:  Other (see comment)(Family)  Transition of Care Consult (CM Consult): Discharge Planning  Health Maintenance Reviewed: Yes  Current Support Network: Lives with Spouse  Confirm Follow Up Transport: Family  The Plan for Transition of Care is Related to the Following Treatment Goals : home with physician follow up   Discharge Location  Discharge Placement: Home with family assistance

## 2020-06-06 PROCEDURE — 96376 TX/PRO/DX INJ SAME DRUG ADON: CPT

## 2020-06-06 PROCEDURE — 74011250636 HC RX REV CODE- 250/636: Performed by: SURGERY

## 2020-06-06 PROCEDURE — 74011250637 HC RX REV CODE- 250/637: Performed by: SURGERY

## 2020-06-06 PROCEDURE — 99218 HC RM OBSERVATION: CPT

## 2020-06-06 RX ADMIN — ONDANSETRON 4 MG: 2 INJECTION INTRAMUSCULAR; INTRAVENOUS at 18:09

## 2020-06-06 RX ADMIN — HYDROMORPHONE HYDROCHLORIDE 1 MG: 1 INJECTION, SOLUTION INTRAMUSCULAR; INTRAVENOUS; SUBCUTANEOUS at 14:03

## 2020-06-06 RX ADMIN — DIPHENHYDRAMINE HYDROCHLORIDE 25 MG: 25 CAPSULE ORAL at 20:29

## 2020-06-06 RX ADMIN — HYDROMORPHONE HYDROCHLORIDE 1 MG: 1 INJECTION, SOLUTION INTRAMUSCULAR; INTRAVENOUS; SUBCUTANEOUS at 22:03

## 2020-06-06 RX ADMIN — HYDROMORPHONE HYDROCHLORIDE 1 MG: 1 INJECTION, SOLUTION INTRAMUSCULAR; INTRAVENOUS; SUBCUTANEOUS at 18:09

## 2020-06-06 RX ADMIN — HYDROMORPHONE HYDROCHLORIDE 1 MG: 1 INJECTION, SOLUTION INTRAMUSCULAR; INTRAVENOUS; SUBCUTANEOUS at 10:01

## 2020-06-06 RX ADMIN — ONDANSETRON 4 MG: 2 INJECTION INTRAMUSCULAR; INTRAVENOUS at 01:42

## 2020-06-06 RX ADMIN — OXYCODONE HYDROCHLORIDE AND ACETAMINOPHEN 1 TABLET: 5; 325 TABLET ORAL at 11:27

## 2020-06-06 RX ADMIN — OXYCODONE HYDROCHLORIDE AND ACETAMINOPHEN 1 TABLET: 5; 325 TABLET ORAL at 03:28

## 2020-06-06 RX ADMIN — DEXTROSE MONOHYDRATE, SODIUM CHLORIDE, AND POTASSIUM CHLORIDE 125 ML/HR: 50; 4.5; 1.49 INJECTION, SOLUTION INTRAVENOUS at 16:31

## 2020-06-06 RX ADMIN — Medication 10 ML: at 05:51

## 2020-06-06 RX ADMIN — ONDANSETRON 4 MG: 2 INJECTION INTRAMUSCULAR; INTRAVENOUS at 05:46

## 2020-06-06 RX ADMIN — OXYCODONE HYDROCHLORIDE AND ACETAMINOPHEN 1 TABLET: 5; 325 TABLET ORAL at 15:42

## 2020-06-06 RX ADMIN — HYDROMORPHONE HYDROCHLORIDE 1 MG: 1 INJECTION, SOLUTION INTRAMUSCULAR; INTRAVENOUS; SUBCUTANEOUS at 01:42

## 2020-06-06 RX ADMIN — ONDANSETRON 4 MG: 2 INJECTION INTRAMUSCULAR; INTRAVENOUS at 22:03

## 2020-06-06 RX ADMIN — Medication 10 ML: at 14:08

## 2020-06-06 RX ADMIN — OXYCODONE HYDROCHLORIDE AND ACETAMINOPHEN 1 TABLET: 5; 325 TABLET ORAL at 19:33

## 2020-06-06 RX ADMIN — ONDANSETRON 4 MG: 2 INJECTION INTRAMUSCULAR; INTRAVENOUS at 10:01

## 2020-06-06 RX ADMIN — ONDANSETRON 4 MG: 2 INJECTION INTRAMUSCULAR; INTRAVENOUS at 14:03

## 2020-06-06 RX ADMIN — OXYCODONE HYDROCHLORIDE AND ACETAMINOPHEN 1 TABLET: 5; 325 TABLET ORAL at 07:07

## 2020-06-06 RX ADMIN — DEXTROSE MONOHYDRATE, SODIUM CHLORIDE, AND POTASSIUM CHLORIDE 125 ML/HR: 50; 4.5; 1.49 INJECTION, SOLUTION INTRAVENOUS at 08:20

## 2020-06-06 RX ADMIN — HYDROMORPHONE HYDROCHLORIDE 1 MG: 1 INJECTION, SOLUTION INTRAMUSCULAR; INTRAVENOUS; SUBCUTANEOUS at 05:47

## 2020-06-06 NOTE — PROGRESS NOTES
Shift Summary :  Pain and nausea medications as requested . Surgical binder in place with MADINA drain patent with serosanguinous drainage. Encouraged to use incentive spirometer, take in fluids and ambulate. Temperature elevation over 100 noted at midnght.

## 2020-06-06 NOTE — PROGRESS NOTES
Room temperature very warm. Encouraged to walk, use IS and take in fluids. Body temperature increasing and now 100.7 . Again reiterated the three interventions above. Says this always happens when he has surgery. 0315  Temperature at this time down to 99.9 .

## 2020-06-06 NOTE — ROUTINE PROCESS
0730 received SBAR from night shift RN. Patient resting quietly in bed. Alert and oriented x 4. Abdominal binder in place. Patient needs encouragement with Incentive spirometry. 1001 patient requested med for 6/10 abd pain and nausea. Dilaudid and Zofran given. Reassessed and pain/nausea subsided. 1127 patient requested Percocet for 5/10 abd pain. Reassessed, patient stated a little relief. [address] Carias Fix on floor to see patient. 1403 patient requested med for 6/10 abd pain and nausea. Dilaudid and Zofran given. Reassessed and patient stated some relief. 46 Percocet requested for 6/10 abd pain. Patient encouraged to ambulate hallway, patient declined. 1809 patient requested med for 6/10 abd pain and nausea. Dilaudid and Zofran given. Patient sitting in recliner throughout shift, did not want to ambulate hallway. Also needs encouragement with IS. Patient consuming adequate amount of meals today, mostly liquids. Patient Vitals for the past 12 hrs: 
 Temp Pulse Resp BP SpO2  
06/06/20 1531 99.7 °F (37.6 °C) 82 16 128/76 97 % 06/06/20 1141 99.6 °F (37.6 °C) 86 16 124/69 95 % 06/06/20 0730 99.8 °F (37.7 °C) 81 17 115/68 96 % Bedside and Verbal shift change report given to Giovanna Beck RN (oncoming nurse) by Dorian Herring RN (offgoing nurse). Report included the following information SBAR, Kardex, Intake/Output, MAR and Recent Results.

## 2020-06-06 NOTE — PROGRESS NOTES
POD#2  Feeling a little better today. Eze PO, no n/v.  Still requiring IV and PO pain meds. BM w flatus today. AVSS    RRR  CTA  S/ND/+BS. Incision c/d/i.  MADINA site c/d w serosang in bulb  No calf tenderness.     No labs    Stable s/p VIHR w mesh  PO as tolerated  OOB/Spirometer  Try to wean off IV analgesia by tomorrow am

## 2020-06-06 NOTE — PROGRESS NOTES
Bedside and verbal shift change report given to Adi Braun RN (on coming nurse) by Mark Stern RN (off going nurse). Report included the following information SBAR, Kardex, OR Summary, Intake/Output and MAR. Shift summary: Pt dressing CDI. MADINA drain, charged, patent and draining serosanguinous drainage putting out 90 ml for this shift. Pt c/o 8/10 pain. PRN Dilaudid and Percocet administered. Reassess pt pain level 3/10 pain which he states is tolerable. Pt ambulate in room. 1500 on IS, needs encouragement. Pt did not order any meals today. Pt stated that he is not hungry and will try to eat tomorrow. 1918 Bedside and verbal shift change report given by Adi Braun RN (off going nurse) to Lazaro Sanchez RN(on coming nurse). Report included the following information SBAR, Kardex, OR Summary, Intake/Output and MAR.

## 2020-06-06 NOTE — ROUTINE PROCESS
Bedside and Verbal shift change report given to STEFANO Casillas RN  (oncoming nurse) by  ROCCO Johnson RN  (offgoing nurse). Report included the following information SBAR, Kardex, OR Summary, Intake/Output and MAR.

## 2020-06-07 VITALS
DIASTOLIC BLOOD PRESSURE: 66 MMHG | TEMPERATURE: 98.3 F | HEART RATE: 89 BPM | SYSTOLIC BLOOD PRESSURE: 117 MMHG | BODY MASS INDEX: 33.28 KG/M2 | WEIGHT: 224.7 LBS | RESPIRATION RATE: 16 BRPM | HEIGHT: 69 IN | OXYGEN SATURATION: 94 %

## 2020-06-07 PROCEDURE — 74011250637 HC RX REV CODE- 250/637: Performed by: SURGERY

## 2020-06-07 PROCEDURE — 74011250636 HC RX REV CODE- 250/636: Performed by: SURGERY

## 2020-06-07 PROCEDURE — 99218 HC RM OBSERVATION: CPT

## 2020-06-07 PROCEDURE — 96376 TX/PRO/DX INJ SAME DRUG ADON: CPT

## 2020-06-07 RX ORDER — OXYCODONE AND ACETAMINOPHEN 5; 325 MG/1; MG/1
1 TABLET ORAL
Qty: 20 TAB | Refills: 0 | Status: SHIPPED | OUTPATIENT
Start: 2020-06-07 | End: 2020-06-12

## 2020-06-07 RX ORDER — HYDROMORPHONE HYDROCHLORIDE 1 MG/ML
1 INJECTION, SOLUTION INTRAMUSCULAR; INTRAVENOUS; SUBCUTANEOUS
Status: DISCONTINUED | OUTPATIENT
Start: 2020-06-07 | End: 2020-06-07

## 2020-06-07 RX ORDER — ONDANSETRON 8 MG/1
8 TABLET, ORALLY DISINTEGRATING ORAL
Qty: 12 TAB | Refills: 0 | Status: SHIPPED | OUTPATIENT
Start: 2020-06-07

## 2020-06-07 RX ADMIN — ONDANSETRON 4 MG: 2 INJECTION INTRAMUSCULAR; INTRAVENOUS at 11:43

## 2020-06-07 RX ADMIN — OXYCODONE HYDROCHLORIDE AND ACETAMINOPHEN 1 TABLET: 5; 325 TABLET ORAL at 00:26

## 2020-06-07 RX ADMIN — ONDANSETRON 4 MG: 2 INJECTION INTRAMUSCULAR; INTRAVENOUS at 02:14

## 2020-06-07 RX ADMIN — HYDROMORPHONE HYDROCHLORIDE 1 MG: 1 INJECTION, SOLUTION INTRAMUSCULAR; INTRAVENOUS; SUBCUTANEOUS at 06:23

## 2020-06-07 RX ADMIN — OXYCODONE HYDROCHLORIDE AND ACETAMINOPHEN 1 TABLET: 5; 325 TABLET ORAL at 04:09

## 2020-06-07 RX ADMIN — Medication 10 ML: at 13:06

## 2020-06-07 RX ADMIN — ONDANSETRON 4 MG: 2 INJECTION INTRAMUSCULAR; INTRAVENOUS at 06:07

## 2020-06-07 RX ADMIN — HYDROMORPHONE HYDROCHLORIDE 1 MG: 1 INJECTION, SOLUTION INTRAMUSCULAR; INTRAVENOUS; SUBCUTANEOUS at 02:10

## 2020-06-07 RX ADMIN — OXYCODONE HYDROCHLORIDE AND ACETAMINOPHEN 1 TABLET: 5; 325 TABLET ORAL at 08:34

## 2020-06-07 RX ADMIN — OXYCODONE HYDROCHLORIDE AND ACETAMINOPHEN 1 TABLET: 5; 325 TABLET ORAL at 13:06

## 2020-06-07 RX ADMIN — HYDROMORPHONE HYDROCHLORIDE 1 MG: 1 INJECTION, SOLUTION INTRAMUSCULAR; INTRAVENOUS; SUBCUTANEOUS at 10:57

## 2020-06-07 NOTE — PROGRESS NOTES
06/07/20 1056 06/07/20 1114   Vital Signs   Temp  --  98.3 °F (36.8 °C)   Temp Source  --  Oral   Pulse (Heart Rate)  --  89   Heart Rate Source  --  Monitor   Resp Rate  --  16   O2 Sat (%)  --  94 %   Level of Consciousness  --  Alert   BP  --  117/66   MAP (Calculated)  --  83   BP 1 Method  --  Automatic   BP 1 Location  --  Right arm   BP Patient Position  --  At rest   MEWS Score  --  1   Pain 1   Pain Scale 1 Numeric (0 - 10)  --    Pain Intensity 1 7  --    Patient Stated Pain Goal 3  --    Pain Reassessment 1 Yes  --    Pain Location 1 Abdomen  --    Pain Orientation 1 Mid  --    Pain Description 1 Aching; Sharp  --         06/07/20 1100   Unmeasurable Output   Stool Occurrence(s) 5  (Since ths AM)   Stool Assessment   Stool Color Brown   Stool Appearance Loose  (Diarrhea as stated by patient)   Stool Amount Large     S: Patient's inquiry about Loperamide 90 mg by mouth daily dose for management of Crohn's disease output and pain/ MD notified. B:  Patient presented with a pain level of 7/10 abdominal pain associated with hernia repair surgery and inquired about receiving home dose of Loperamide 90 mg daily that his primary care physician, Dr. Radu Disla, ordered for him to take for management of painful bowel movement outputs associated with active Crohn's disease. Patient verbalized that he has had 5 diarrhea episodes this am associated with bowel pain. Patient received Percocet 5-235 mg tablet by mouth around 8:34 am and Dilaudid 1 mg IV around 10:57 am  For management of severe abdominal pain. Additionally, patient was worried about potential constipation associated with persistent narcotic pain management regimen which he felt would also worsen the pain associated with his crohn's disease. Patient was informed that Dr. Vernell Hess would be contacted and notified about his concerns and inquired about adding Loperamide 90 mg by mouth home dose to [facility].    A: At 1119 am. Dr. Vernell Hess was paged by . Charge RN Susannah Killian was notified of patient's concerns listed above and MD page and verbalized understanding. R: Will continue with prescribed plan of care as directed.    Barron Smith  6/7/2020  11:34 AM

## 2020-06-07 NOTE — DISCHARGE SUMMARY
Physician Discharge Summary     Patient ID:  Dakota Wood  [id number]  78 y.o.  1970    Allergies: Humira [adalimumab]; Reglan [metoclopramide]; Shellfish derived; Tramadol; Colestid [colestipol]; Compazine [prochlorperazine edisylate]; Iodoform; Morphine; Nalbuphine; Toradol [ketorolac tromethamine]; and Zosyn [piperacillin-tazobactam]    Admit Date: 6/4/2020    Discharge Date: 6/7/2020    * Admission Diagnoses: Incisional hernia with obstruction but no gangrene [K43.0]    * Discharge Diagnoses:    Hospital Problems as of 6/7/2020 Date Reviewed: 6/4/2020          Codes Class Noted - Resolved POA    Incisional hernia with obstruction but no gangrene ICD-10-CM: K43.0  ICD-9-CM: 552.21  6/4/2020 - Present Unknown               Admission Condition: Fair    * Discharge Condition: improved    * Procedures: Procedure(s):  OPEN REPAIR INCARCERATED RECURRENT INCISIONAL HERNIA    * Hospital Course:   Normal hospital course for this procedure. Consults: None    Significant Diagnostic Studies: labs    * Disposition: Home    Discharge Medications:   Current Discharge Medication List      START taking these medications    Details   oxyCODONE-acetaminophen (PERCOCET) 5-325 mg per tablet Take 1 Tab by mouth every four (4) hours as needed for Pain for up to 5 days. Max Daily Amount: 6 Tabs. Indications: pain  Qty: 20 Tab, Refills: 0    Associated Diagnoses: S/P repair of ventral hernia         CONTINUE these medications which have CHANGED    Details   ondansetron (ZOFRAN ODT) 8 mg disintegrating tablet Take 1 Tab by mouth every eight (8) hours as needed for Nausea or Vomiting. Indications: prevent nausea and vomiting after surgery  Qty: 12 Tab, Refills: 0    Associated Diagnoses: S/P repair of ventral hernia         CONTINUE these medications which have NOT CHANGED    Details   loperamide (Imodium A-D) 2 mg capsule Take 90 mg by mouth daily.  Takes 45 tablets      chlorzoxazone (PARAFON FORTE) 500 mg tablet Take 500 mg by mouth three (3) times daily as needed for Muscle Spasm(s). * Follow-up Care/Patient Instructions: Activity: No lifting or Strenuous exercise for 4 weeks  Diet: Diabetic Diet  Wound Care: Pt may shower -- keep MADINA site as dry as possible. MADINA drain dressing change daily. Follow-up Information     Follow up With Specialties Details Why Contact Info    Chris Jones [facility] Physician Assistant   [address] [place] [address] [address] [facility]      Bob Weir MD General Surgery On 6/11/2020 Follow up appointment scheduled for June 11, 2020 at 10:20 a.m. This is a VIRTUAL VISIT. MD's office will contact patient [facility] process.  [address]  [phone]          Follow-up tests/labs none    Signed:  Yayo Wiseman MD  6/7/2020  1:31 PM

## 2020-06-07 NOTE — PROGRESS NOTES
Pt in stable condition.  No s/s of acute distress noted/vocied.  Plan of care discussed, verbalized understanding.  Comfort and care given.  All safety and comfort measures in place. Incentive spirometer at bedside and encouraged use. Abdominal dressing CDI. MADINA patent and draining a small amount seranguinous output. Medicated for pain as per MD order. Pt states a pain goal of 3/10. Pt aware of weening him off IV pain medication as per MD notes for discharge planning. 2041:  Medic called to asset in starting a new IV. IV site discomfort voiced and slight edema noted. 2043:  Pt request recent K level due to his continuous [id number]. Noted no recent lab work on profile. Pt voiced decrease appetite because of bland taste. MD Post called regarding pt request for lab work and ensure. MD aware. No order for labs at this time. Voiced he'll discuss labs with patient in AM.  Order to decrease fluids rate given. No order for ensure. MD voiced to encourage regular diet. Pt aware and verbalized understanding. Pt refusing IVF,  pending to see MD.    0730: Bedside shift change report given to  [facility] (oncoming nurse) by Khari More (offgoing nurse). Report included the following information SBAR, Kardex, Procedure Summary, MAR and Recent Results.

## 2020-06-07 NOTE — PROGRESS NOTES
06/07/20 1114 06/07/20 1306   Vital Signs   Temp 98.3 °F (36.8 °C)  --    Temp Source Oral  --    Pulse (Heart Rate) 89  --    Heart Rate Source Monitor  --    Resp Rate 16  --    O2 Sat (%) 94 %  --    Level of Consciousness Alert  --    /66  --    MAP (Calculated) 83  --    BP 1 Method Automatic  --    BP 1 Location Right arm  --    BP Patient Position At rest  --    MEWS Score 1  --    Pain 1   Pain Scale 1 Numeric (0 - 10) Numeric (0 - 10)   Pain Intensity 1 4 6   Patient Stated Pain Goal 0 3   Pain Location 1 Abdomen Abdomen   Pain Orientation 1 Mid Mid   Pain Description 1 Aching; Sharp Aching   Pain Intervention(s) 1  --  Medication (see MAR)           S: Patient discharge completed. B: Patient presented with discharge orders home was directed. Patient's left forearm 22 gauge catheter was removed and patient dressed himself as well as gathered his patient belongings independently. A: Patient presented awake, alert and responsive with the most recent set of vital signs listed above at the time of discharge. Patient was provided discharge education, instructions and provided paper prescription for Percocet 5-325 mg. Patient verified that [facility]acy [facility] not [facility] within [place] but presented at [address] [place] [address], [address] [place] [address] [phone]. Understanding was verbalized and attending provider Dr. Becky Restrepo was notified. [facility] was updated within system and pharmacy  Pharmacy representative Nery Orozco was contacted and inquired as to whether they received e-prescribed prescription for oral Zofran as listed on patient's discharge paperwork. Pharmacy representative Nery Orozco verbalized that prescription had been received and patient was notified. Additionally, patient was provided instructions on how to drain MADINA Drain and provide dressing care around insertion site in which he verbalized/demonstrated understanding.  He was also notified of his discharge follow up appointment with Dr. Celia Sahu and patient verbalized understanding. Patient verbalized understanding to all discharge education and instructions. Patient was awaiting discharge home with spouse. R: Will continue with prescribed plan of care as directed.    Barron Smith  6/7/2020  2:33 PM

## 2020-06-07 NOTE — PROGRESS NOTES
POD#3  Feeling today. Eze PO, no n/v.  Pain control better. BM w flatus today, looser (h/o Crohns). AVSS    RRR  CTA  S/ND/+BS. Incision c/d/i.  MADINA site c/d w serosang in bulb  No calf tenderness.     No labs    Stable s/p VIHR w mesh  PO as tolerated  OOB  MADINA drain training  D/C to home

## 2020-06-07 NOTE — ROUTINE PROCESS
1300 Dr Florentino Whitlock assessing patient. Stated to leave MADINA drain in at discharge. Dr Florentino Whitlock answering all patient questions. Dr Florentino Wihtlock also gave explanation to patient regarding specific concern of patient regarding continuing IV fluids, lab draws, and Ensure nutrition supplement. Patient educated on care of MADINA drain: milking of tube, emptying and documenting output, and recharging bulb after emptying. Additional dressing change material given to patient for daily dressing changes.

## 2020-06-07 NOTE — PROGRESS NOTES
1230 Received call from patients wife asking to \"expedite discharge\". Wife states \"I am a nurse I can take care of him at home\" Wife upset about IV fluids being continued and overall communication regarding patients care over weekend all non nursing complaints. Called and spoke to patients primary Rn to reach out to Dr Astrid Shay for updates and expected discharge time. 1330 Attempted to call wife back, discharge orders placed in chart, no answer and mailbox full unable to leave message.

## 2020-06-07 NOTE — PROGRESS NOTES
06/07/20 1100   Unmeasurable Output   Stool Occurrence(s) 5  (Since ths AM)   Stool Assessment   Stool Color Brown   Stool Appearance Loose  (Diarrhea as stated by patient)   Stool Amount Large      S: MD's response in accordance to patient's inquiry about Loperamide 90 mg by mouth daily dose for management of Crohn's disease output and pain/ No new orders provided. B:  Patient presented with a pain level of 7/10 abdominal pain associated with hernia repair surgery and inquired about receiving home dose of Loperamide 90 mg daily that his primary care physician, Dr. Amelie Neville, ordered for him to take for management of painful bowel movement outputs associated with active Crohn's disease. Patient verbalized that he has had 5 diarrhea episodes this am associated with bowel pain. Patient received Percocet 5-235 mg tablet by mouth around 8:34 am and Dilaudid 1 mg IV around 10:57 am  For management of severe abdominal pain. Additionally, patient was worried about potential constipation associated with persistent narcotic pain management regimen which he felt would also worsen the pain associated with his crohn's disease. Patient was informed that Dr. James Becker would be contacted and notified about his concerns and inquired about adding Loperamide 90 mg by mouth home dose to [facility] At 1119 am. Dr. James Becker was paged by . Charge GIO Monson was notified of patient's concerns listed above and MD page and verbalized understanding. A: At 1145 am, Dr. James Becker contacted unit and was notified about patient's persistent diarrhea outputs listed above in accordance to patient's Crohn's disease and patient's concerns in reference to adding home medication Loperamide 90 mg by mouth daily for management of pain associated with bowel outputs. Dr. Cayden Mcguire stated that patient would be prospectively discharged home today and that he could continue with home loperamide dose at that time. No new orders were given. Patient will be notified of Dr. Suggs Safer response. R: Will continue with prescribed plan of care as directed.    Barron Smith  6/7/2020  11:54 AM

## 2020-06-11 NOTE — OP NOTES
[facility]  OPERATIVE REPORT    Name:  Adeline Casas  MR#:   [id number]  :  1970  ACCOUNT #:  [de-identified]  DATE OF SERVICE:    PREOPERATIVE DIAGNOSIS:  Recurrent incarcerated incisional hernia. POSTOPERATIVE DIAGNOSIS:  Recurrent incarcerated incisional hernia. PROCEDURE PERFORMED:  1. Open repair of recurrent incarcerated incisional hernia. 2.  Myofascial release. SURGEON:  Loree Bravo MD    ASSISTANT:  None. ANESTHESIA:  General endotracheal.    COMPLICATIONS:  None. SPECIMENS REMOVED:  None. IMPLANTS:  None. ESTIMATED BLOOD LOSS:  Minimal.    DRAINS:  Melvin-Pederson x1. INDICATION:  This gentleman presents with complex recurrent incisional hernia. He will undergo repair. PROCEDURE:  He was placed in the supine position. His abdomen was prepped and draped in the usual fashion. An incision was made in the midline where the patient's previous repair was. This was carried down through the subcutaneous tissue for a lot of scar. The patient had a right lower quadrant recurrent incisional hernia associated also with a right lower quadrant transverse incision. I was able to dissect out most of the hernia sac in the right lower quadrant; however, after doing this, I decided to extend the incision and reopen the right lower quadrant transverse incision and connect it with the midline incision to form a T. Once this was done, I was able to access the far lateral slight part of the hernia. This was a difficult hernia to access and it was eventually able to dissect off the sac. It was a complex hernia and involved incarcerated adhesions and bowel. I was able to dissect out the bowel and open the hernia sac and takedown any adhesions which were causing problems. Once this was done, the bowel was freed up. I was able to see the hernia edges where previous mesh had been removed from his previous surgery. It was a large complex hernia defect.   I decided to do a retrorectus repair and sandwich onlay. A myofascial release was done superiorly in a partial fashion anteriorly to allow adequate primary closure. This was done with electrocautery. Once the anterior abdominal wall fascia was , I  the posterior components, so that the planes were obscured. However, I did get underneath muscles; however, particularly, laterally and inferiorly, it was very hard to distinguish the exact layers because of the severe scarring in previous surgery. However, I was able to close the peritoneum and place a large piece of ProGrip mesh in the retrorectus space. Once this was done, the lower posterior space besides peritoneum also involved some posterior tissue and fascia. Once the retrorectus mesh was placed, I was able to close the anterior abdominal wall, muscle and fascia over this utilizing a component separation with as little tension as possible. The inferior peritoneum and fascial layer were closed with a #1 PDS interrupted suture, and the anterior abdominal wall fascia was closed with a looped 0 nylon continuous suture. Next, EXPAREL was injected locally for postoperative pain management. I placed another ProGrip mesh as a sandwich-type repair over the anterior abdominal wall and allowed this to self  for added security. This was placed and was in a good position. The 15-Malagasy round Aurea Flirt drain was left in for postoperative drainage. The subcutaneous tissues were closed in layers using 2-0 and 3-0 Vicryl suture, and the skin was closed with 4-0 Monocryl subcuticular closure and Dermabond. The patient tolerated the procedure well.       MD OLIVERIO Lawler/[email or web address]  D:  06/11/2020 6:38  T:  06/11/2020 10:47  JOB #:  [id number]

## (undated) DEVICE — SUT ETHLN 3-0 18IN PS2 BLK --

## (undated) DEVICE — BINDER ABD S/M 12X30-45IN --

## (undated) DEVICE — SUT VCRL + 2-0 36IN CT1 UD --

## (undated) DEVICE — SUTURE PDS + SZ 1 L96IN ABSRB VLT L65MM TP-1 1/2 CIR PDP880G

## (undated) DEVICE — STERILE POLYISOPRENE POWDER-FREE SURGICAL GLOVES WITH EMOLLIENT COATING: Brand: PROTEXIS

## (undated) DEVICE — SPONGE LAP 18X18IN STRL -- 5/PK

## (undated) DEVICE — SPONGE GZ W4XL4IN COT 12 PLY TYP VII WVN C FLD DSGN

## (undated) DEVICE — PAD,ABDOMINAL,5"X9",ST,LF,25/BX: Brand: MEDLINE INDUSTRIES, INC.

## (undated) DEVICE — DERMABOND SKIN ADH 0.7ML -- DERMABOND ADVANCED 12/BX

## (undated) DEVICE — Device

## (undated) DEVICE — MINOR: Brand: MEDLINE INDUSTRIES, INC.

## (undated) DEVICE — SUTURE ETHLN 0 L96IN NONABSORBABLE BLK CT L40MM 1/2 CIR L886T

## (undated) DEVICE — 3-0 COATED VICRYL PLUS UNDYED 1X27" SH --

## (undated) DEVICE — SUTURE PDS II SZ 0 L27IN ABSRB VLT L36MM CT-1 1/2 CIR Z340H

## (undated) DEVICE — DRAIN SURG 15FR SIL RND CHN W/ TRCR FULL FLUT DBL WRP TRAD

## (undated) DEVICE — GARMENT,MEDLINE,DVT,INT,CALF,MED, GEN2: Brand: MEDLINE

## (undated) DEVICE — DRESSING PETRO W3XL8IN N ADH OIL EMUL GZ CURAD

## (undated) DEVICE — SUT MONOCRYL PLUS UD 4-0 --

## (undated) DEVICE — STERILE POLYISOPRENE POWDER-FREE SURGICAL GLOVES: Brand: PROTEXIS